# Patient Record
Sex: FEMALE | Race: BLACK OR AFRICAN AMERICAN | NOT HISPANIC OR LATINO | URBAN - METROPOLITAN AREA
[De-identification: names, ages, dates, MRNs, and addresses within clinical notes are randomized per-mention and may not be internally consistent; named-entity substitution may affect disease eponyms.]

---

## 2023-08-28 ENCOUNTER — EMERGENCY (EMERGENCY)
Facility: HOSPITAL | Age: 73
LOS: 1 days | Discharge: HOME | End: 2023-08-28
Attending: STUDENT IN AN ORGANIZED HEALTH CARE EDUCATION/TRAINING PROGRAM | Admitting: STUDENT IN AN ORGANIZED HEALTH CARE EDUCATION/TRAINING PROGRAM
Payer: MEDICAID

## 2023-08-28 VITALS
OXYGEN SATURATION: 100 % | RESPIRATION RATE: 16 BRPM | DIASTOLIC BLOOD PRESSURE: 66 MMHG | TEMPERATURE: 98 F | HEART RATE: 78 BPM | SYSTOLIC BLOOD PRESSURE: 120 MMHG

## 2023-08-28 VITALS
DIASTOLIC BLOOD PRESSURE: 87 MMHG | TEMPERATURE: 99 F | RESPIRATION RATE: 16 BRPM | OXYGEN SATURATION: 98 % | HEART RATE: 106 BPM | SYSTOLIC BLOOD PRESSURE: 142 MMHG

## 2023-08-28 PROCEDURE — 70450 CT HEAD/BRAIN W/O DYE: CPT | Mod: 26,MA

## 2023-08-28 PROCEDURE — 99284 EMERGENCY DEPT VISIT MOD MDM: CPT

## 2023-08-28 PROCEDURE — 73020 X-RAY EXAM OF SHOULDER: CPT | Mod: 26,59,RT

## 2023-08-28 PROCEDURE — 73030 X-RAY EXAM OF SHOULDER: CPT | Mod: 26,RT

## 2023-08-28 PROCEDURE — 73070 X-RAY EXAM OF ELBOW: CPT | Mod: 26,RT

## 2023-08-28 PROCEDURE — 73060 X-RAY EXAM OF HUMERUS: CPT | Mod: 26,RT

## 2023-08-28 PROCEDURE — 93010 ELECTROCARDIOGRAM REPORT: CPT

## 2023-08-28 RX ORDER — KETOROLAC TROMETHAMINE 30 MG/ML
15 SYRINGE (ML) INJECTION ONCE
Refills: 0 | Status: DISCONTINUED | OUTPATIENT
Start: 2023-08-28 | End: 2023-08-28

## 2023-08-28 RX ADMIN — Medication 15 MILLIGRAM(S): at 11:48

## 2023-08-28 NOTE — ED ADULT NURSE NOTE - OBJECTIVE STATEMENT
Pt received to rm 17 presents s/p slip and fall while coming out of shower on sat. Pt a&ox4, ambulatory at baseline, skin intact, respirations even and unlabored, abd soft and non-distended, nontender to palpation. Pt reports she hit back of head. Denies LOC, AC use, chest pain, sob, dizziness. VSS, will medicate as per ordered, will continue to monitor.

## 2023-08-28 NOTE — ED PROVIDER NOTE - PATIENT PORTAL LINK FT
You can access the FollowMyHealth Patient Portal offered by [facility] by registering at the following website: [email or web address]. By joining [facility]’s FollowMyHealth portal, you will also be able to view your health information using other applications (apps) compatible with our system.

## 2023-08-28 NOTE — ED PROVIDER NOTE - PHYSICAL EXAMINATION
GENERAL: no acute distress, non-toxic appearing  HEENT: normal conjunctiva, oral mucosa moist  CARDIAC: regular rate and regular rhythm, 2+ distal pulses all extremities  PULM: clear to ascultation bilaterally, no appreciable crackles, rales, rhonchi, or wheezing, sats 100% on RA, no increased work of breathing  GI: abdomen nondistended, soft, nontender  : no CVA tenderness, no suprapubic tenderness  NEURO: alert and oriented x 3, normal speech, CN's 3-12 intact, EOMI without nystagmus, EULALIO, 5/5 strength all extremities  MSK: no visible deformities, no peripheral edema, pt with ttp to R shoulder at deltoid and has pain with AROM  SKIN: no visible rashes  PSYCH: appropriate mood and affect

## 2023-08-28 NOTE — ED ADULT NURSE NOTE - NSFALLRISKINTERV_ED_ALL_ED

## 2023-08-28 NOTE — ED PROVIDER NOTE - NSFOLLOWUPINSTRUCTIONS_ED_ALL_ED_FT
- Please follow up with Orthopedics within 2 weeks. Bring your results from today. We made a referral for you.    - Tylenol up to 650 mg every 6 hours as needed for pain and when pain is severe you may take 200-400mg of ibuprofen.    - Be sure to return to the ED if you develop new, worsening, or any distressing symptoms.

## 2023-08-28 NOTE — ED ADULT TRIAGE NOTE - CHIEF COMPLAINT QUOTE
Daughter in law Yanci states" she slip and fell in the bathroom 2 days ago and c/o pain to right shoulder " denies LOC. denies use of blood thinners. denies any past medical history, c/o pain to right shoulder

## 2023-08-28 NOTE — ED PROVIDER NOTE - ATTENDING CONTRIBUTION TO CARE
I have personally performed a face to face medical and diagnostic evaluation of the patient. I have discussed with and reviewed the Resident's note and agree with the History, ROS, Physical Exam and MDM unless otherwise indicated. A brief summary of my personal evaluation and impression can be found below.    Gio MAYS: 73F no pmh on omperazole only presents with a cc of s/p slip and fall in shower earlier today son proving hx at bedside, reports slip on water or mat, w head strike no LOC recalls entire event since then has been having mostly R shoulder pain a/w decreasd ROM 2/2 pain otherwise can move and feel everything no nausea no vomiting no fever , no CP or SOb or BYRD, no fever. Denies changes in vision. Has been ambulating since event.     All other ROS negative, except as above and as per HPI and ROS section.    VITALS: Initial triage and subsequent vitals have been reviewed by me.  GEN APPEARANCE: Alert, non-toxic, well-appearing, NAD.  HEAD: Atraumatic.  EYES: PERRLa, EOMI, vision grossly intact.   NECK: Supple  CV: RRR, S1S2, no c/r/m/g. Cap refill <2 seconds. No bruits.   LUNGS: CTAB. No abnormal breath sounds.  ABDOMEN: Soft, NTND. No guarding or rebound.   MSK/EXT: No spinal or extremity point tenderness. No CVA ttp. Pelvis stable. No peripheral edema. R shoulder through elbow ttp, no distal ttp, DNVI +2 radial pulse   NEURO: Alert, follows commands. Weight bearing normal. Speech normal. Sensation and motor normal x4 extremities.   SKIN: Warm, dry and intact. No rash.  PSYCH: Appropriate    Plan/MDM:  exam vss non toxic PE as above ddx c/f msk sprain/strain in setting of mechanical fall, will get xr of affected extremity, cth, meds as needed, reassess, anticipate dc.

## 2023-08-28 NOTE — ED PROVIDER NOTE - OBJECTIVE STATEMENT
73F no signif PMH presents with R shoulder pain. Pt states she fell 3 days ago though didn't have this pain that she's experiencing until last night, tried tylenol last dose this morning with only slight improvement of sxs. Pt states when she fell she slipped and fell, did have posterior headstrike without LOC. Denies any pain anywhere else. Denies any recent fevers/chills, uri sxs, cough, chest pain/sob/lighteadedness, headache/vision changes, abd pain, n/v/d, dark/bloody stools, urinary sxs. Pt not on AC's.